# Patient Record
Sex: FEMALE | Race: WHITE | NOT HISPANIC OR LATINO | URBAN - NONMETROPOLITAN AREA
[De-identification: names, ages, dates, MRNs, and addresses within clinical notes are randomized per-mention and may not be internally consistent; named-entity substitution may affect disease eponyms.]

---

## 2021-06-16 ENCOUNTER — OFFICE VISIT (OUTPATIENT)
Dept: URBAN - NONMETROPOLITAN AREA CLINIC 13 | Facility: CLINIC | Age: 81
End: 2021-06-16
Payer: COMMERCIAL

## 2021-06-16 PROCEDURE — 92134 CPTRZ OPH DX IMG PST SGM RTA: CPT | Performed by: OPHTHALMOLOGY

## 2021-06-16 PROCEDURE — 92014 COMPRE OPH EXAM EST PT 1/>: CPT | Performed by: OPHTHALMOLOGY

## 2021-06-16 ASSESSMENT — INTRAOCULAR PRESSURE
OS: 16
OD: 15

## 2021-06-16 NOTE — IMPRESSION/PLAN
Impression: Retinal detachment with single break, left eye: H33.012. 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 She will be scheduled for a Vit, MS, PRP and Air OS in the near future.

## 2021-06-22 ENCOUNTER — SURGERY (OUTPATIENT)
Dept: URBAN - NONMETROPOLITAN AREA SURGERY 4 | Facility: SURGERY | Age: 81
End: 2021-06-22
Payer: COMMERCIAL

## 2021-06-22 PROCEDURE — 67043 VIT FOR MEMBRANE DISSECT: CPT | Performed by: OPHTHALMOLOGY

## 2021-06-22 RX ORDER — HYDROCODONE BITARTRATE AND ACETAMINOPHEN 5; 325 MG/1; MG/1
TABLET ORAL
Qty: 10 | Refills: 0 | Status: ACTIVE
Start: 2021-06-22

## 2021-06-22 RX ORDER — PREDNISOLONE ACETATE 10 MG/ML
1 % SUSPENSION/ DROPS OPHTHALMIC
Qty: 1 | Refills: 0 | Status: ACTIVE
Start: 2021-06-22

## 2021-06-22 RX ORDER — OFLOXACIN 3 MG/ML
0.3 % SOLUTION/ DROPS OPHTHALMIC
Qty: 1 | Refills: 0 | Status: ACTIVE
Start: 2021-06-22

## 2021-06-23 ENCOUNTER — POST-OPERATIVE VISIT (OUTPATIENT)
Dept: URBAN - NONMETROPOLITAN AREA CLINIC 13 | Facility: CLINIC | Age: 81
End: 2021-06-23

## 2021-06-23 DIAGNOSIS — Z48.810 ENCOUNTER FOR SURGICAL AFTERCARE FOLLOWING SURGERY ON A SENSE ORGAN: Primary | ICD-10-CM

## 2021-06-23 PROCEDURE — 99024 POSTOP FOLLOW-UP VISIT: CPT | Performed by: OPTOMETRIST

## 2021-06-23 ASSESSMENT — INTRAOCULAR PRESSURE
OS: 17
OD: 18

## 2021-06-23 NOTE — IMPRESSION/PLAN
Impression: S/P Posterior Vitrectomy; Endo laser/Indirect laser; Epiretinal/Subfoveal Membrane Stripping; AFX (Air Fluid Gas Exchange) OS - 1 Day. Encounter for surgical aftercare following surgery on a sense organ  Z48.810. Post operative instructions reviewed - Plan: start standard retina gtts, tears daily, pt ed on s/s of infection/rd/high iop need to rtc asap. monitor x 2 weeks w/ dfe and iop check OS as directed but needs to return in 2 days for bcl removal as was placed on eye to help w/ abrasion healing and comfort.

## 2021-06-25 ENCOUNTER — POST-OPERATIVE VISIT (OUTPATIENT)
Dept: URBAN - NONMETROPOLITAN AREA CLINIC 13 | Facility: CLINIC | Age: 81
End: 2021-06-25

## 2021-06-25 PROCEDURE — 99024 POSTOP FOLLOW-UP VISIT: CPT | Performed by: OPTOMETRIST

## 2021-06-25 NOTE — IMPRESSION/PLAN
Impression: S/P Posterior Vitrectomy; Endo laser/Indirect laser; Epiretinal/Subfoveal Membrane Stripping; AFX (Air Fluid Gas Exchange) OS - 3 Days. Encounter for surgical aftercare following surgery on a sense organ  Z48.810. Post operative instructions reviewed - Condition is improving - Plan: continue w/  standard retina gtts, tears daily and systane gel at night, pt ed on s/s of infection/rd/high iop need to rtc asap. monitor x as directed w/ dr melendez for f/up.

## 2021-07-07 ENCOUNTER — OFFICE VISIT (OUTPATIENT)
Dept: URBAN - NONMETROPOLITAN AREA CLINIC 13 | Facility: CLINIC | Age: 81
End: 2021-07-07
Payer: COMMERCIAL

## 2021-07-07 DIAGNOSIS — H35.372 PUCKERING OF MACULA, LEFT EYE: Primary | ICD-10-CM

## 2021-07-07 PROCEDURE — 99024 POSTOP FOLLOW-UP VISIT: CPT | Performed by: OPHTHALMOLOGY

## 2021-07-07 PROCEDURE — 92134 CPTRZ OPH DX IMG PST SGM RTA: CPT | Performed by: OPHTHALMOLOGY

## 2021-07-07 ASSESSMENT — INTRAOCULAR PRESSURE
OD: 15
OS: 18

## 2021-07-07 NOTE — IMPRESSION/PLAN
Impression: Retinal detachment with single break, left eye: H33.012. Plan: The exam and oct shows that the patients retina is attached 360 OS. The patient will taper her post op drops and return to clinic in 2-3 months for a dilated follow up and oct.

## 2021-09-22 ENCOUNTER — OFFICE VISIT (OUTPATIENT)
Dept: URBAN - NONMETROPOLITAN AREA CLINIC 13 | Facility: CLINIC | Age: 81
End: 2021-09-22
Payer: COMMERCIAL

## 2021-09-22 DIAGNOSIS — H33.012 RETINAL DETACHMENT WITH SINGLE BREAK, LEFT EYE: Primary | ICD-10-CM

## 2021-09-22 PROCEDURE — 99024 POSTOP FOLLOW-UP VISIT: CPT | Performed by: OPHTHALMOLOGY

## 2021-09-22 PROCEDURE — 92134 CPTRZ OPH DX IMG PST SGM RTA: CPT | Performed by: OPHTHALMOLOGY

## 2021-09-22 ASSESSMENT — INTRAOCULAR PRESSURE
OS: 14
OD: 15

## 2021-12-08 ENCOUNTER — PROCEDURE (OUTPATIENT)
Dept: URBAN - NONMETROPOLITAN AREA CLINIC 13 | Facility: CLINIC | Age: 81
End: 2021-12-08
Payer: COMMERCIAL

## 2021-12-08 PROCEDURE — 99214 OFFICE O/P EST MOD 30 MIN: CPT | Performed by: OPHTHALMOLOGY

## 2021-12-08 PROCEDURE — 92134 CPTRZ OPH DX IMG PST SGM RTA: CPT | Performed by: OPHTHALMOLOGY

## 2021-12-08 ASSESSMENT — INTRAOCULAR PRESSURE
OD: 17
OS: 18

## 2021-12-08 NOTE — IMPRESSION/PLAN
Impression: Retinal detachment with single break, left eye: H33.012. Plan: The exam and oct shows that the patients retina is attached 360 OS. There is no treatment necessary today. The patient will return to clinic in 6 months for a dilated follow up and possible OCT.

## 2025-07-11 ENCOUNTER — OFFICE VISIT (OUTPATIENT)
Dept: URBAN - NONMETROPOLITAN AREA CLINIC 13 | Facility: CLINIC | Age: 85
End: 2025-07-11
Payer: COMMERCIAL

## 2025-07-11 DIAGNOSIS — H18.12 BULLOUS KERATOPATHY OF LEFT EYE: Primary | ICD-10-CM

## 2025-07-11 PROCEDURE — 92004 COMPRE OPH EXAM NEW PT 1/>: CPT | Performed by: OPHTHALMOLOGY

## 2025-07-11 RX ORDER — SODIUM CHLORIDE 50 MG/ML
5 % SOLUTION OPHTHALMIC
Qty: 0 | Refills: 0 | Status: ACTIVE
Start: 2025-07-11

## 2025-07-11 ASSESSMENT — INTRAOCULAR PRESSURE
OS: 15
OD: 15